# Patient Record
Sex: FEMALE
[De-identification: names, ages, dates, MRNs, and addresses within clinical notes are randomized per-mention and may not be internally consistent; named-entity substitution may affect disease eponyms.]

---

## 2024-08-30 ENCOUNTER — DASHBOARD ENCOUNTERS (OUTPATIENT)
Age: 64
End: 2024-08-30

## 2024-09-10 ENCOUNTER — OFFICE VISIT (OUTPATIENT)
Dept: RURAL CLINIC 2 | Facility: CLINIC | Age: 64
End: 2024-09-10
Payer: COMMERCIAL

## 2024-09-10 VITALS
DIASTOLIC BLOOD PRESSURE: 79 MMHG | HEIGHT: 63 IN | WEIGHT: 111.8 LBS | SYSTOLIC BLOOD PRESSURE: 108 MMHG | BODY MASS INDEX: 19.81 KG/M2 | HEART RATE: 74 BPM | TEMPERATURE: 97.8 F

## 2024-09-10 DIAGNOSIS — Z86.010 PERSONAL HISTORY OF COLONIC POLYPS: ICD-10-CM

## 2024-09-10 DIAGNOSIS — K86.81 EXOCRINE PANCREATIC INSUFFICIENCY: ICD-10-CM

## 2024-09-10 DIAGNOSIS — K52.89 (LYMPHOCYTIC) MICROSCOPIC COLITIS: ICD-10-CM

## 2024-09-10 PROBLEM — 428283002: Status: ACTIVE | Noted: 2024-09-10

## 2024-09-10 PROBLEM — 47367009: Status: ACTIVE | Noted: 2024-09-10

## 2024-09-10 PROBLEM — 236071009: Status: ACTIVE | Noted: 2024-09-10

## 2024-09-10 PROCEDURE — 99204 OFFICE O/P NEW MOD 45 MIN: CPT | Performed by: INTERNAL MEDICINE

## 2024-09-10 PROCEDURE — 99244 OFF/OP CNSLTJ NEW/EST MOD 40: CPT | Performed by: INTERNAL MEDICINE

## 2024-09-10 RX ORDER — PANCRELIPASE LIPASE, PANCRELIPASE PROTEASE, PANCRELIPASE AMYLASE 40000; 126000; 168000 [USP'U]/1; [USP'U]/1; [USP'U]/1
CAPSULE, DELAYED RELEASE ORAL
Qty: 100 CAPSULE | Status: ACTIVE | COMMUNITY

## 2024-09-10 RX ORDER — LEVETIRACETAM 500 MG/1
TABLET, FILM COATED ORAL
Qty: 800 TABLET | Status: ACTIVE | COMMUNITY

## 2024-09-10 RX ORDER — LAMOTRIGINE 25 MG/1
TABLET ORAL
Qty: 180 TABLET | Status: DISCONTINUED | COMMUNITY

## 2024-09-10 RX ORDER — CHOLESTYRAMINE LIGHT 4 G/5.7G
POWDER, FOR SUSPENSION ORAL
Qty: 239.4 GRAM | Status: ACTIVE | COMMUNITY

## 2024-09-10 RX ORDER — LAMOTRIGINE 100 MG/1
1 TABLET TABLET ORAL ONCE A DAY
Status: ACTIVE | COMMUNITY

## 2024-09-10 RX ORDER — QUETIAPINE FUMARATE 25 MG/1
TABLET ORAL
Qty: 180 TABLET | Status: ACTIVE | COMMUNITY

## 2024-09-10 RX ORDER — QUETIAPINE 50 MG/1
1 TABLET AT BEDTIME TABLET, FILM COATED ORAL ONCE A DAY
Status: ACTIVE | COMMUNITY

## 2024-09-10 RX ORDER — AMLODIPINE BESYLATE 5 MG/1
TABLET ORAL
Qty: 100 TABLET | Status: ACTIVE | COMMUNITY

## 2024-09-10 RX ORDER — CLOBAZAM 10 MG/1
TABLET ORAL
Qty: 75 TABLET | Status: ACTIVE | COMMUNITY

## 2024-09-10 RX ORDER — MELOXICAM 7.5 MG/1
TABLET ORAL
Qty: 15 TABLET | Status: DISCONTINUED | COMMUNITY

## 2024-09-10 RX ORDER — PANCRELIPASE LIPASE, PANCRELIPASE PROTEASE, PANCRELIPASE AMYLASE 40000; 126000; 168000 [USP'U]/1; [USP'U]/1; [USP'U]/1
2 CAPSULES WITH MEALS AND 1 WITH SNACKS CAPSULE, DELAYED RELEASE ORAL
Qty: 900 CAPSULES | Refills: 11 | OUTPATIENT
Start: 2024-09-10 | End: 2027-08-26

## 2024-09-10 RX ORDER — CEPHALEXIN 500 MG/1
TAKE 1 CAPSULE BY MOUTH FOUR TIMES DAILY CAPSULE ORAL
Qty: 28 EACH | Refills: 0 | Status: DISCONTINUED | COMMUNITY

## 2024-09-10 RX ORDER — METOPROLOL SUCCINATE 25 MG/1
TABLET, EXTENDED RELEASE ORAL
Qty: 90 TABLET | Status: ACTIVE | COMMUNITY

## 2024-09-10 RX ORDER — CARBAMAZEPINE 100 MG/1
CAPSULE, EXTENDED RELEASE ORAL
Qty: 420 CAPSULE | Status: DISCONTINUED | COMMUNITY

## 2024-09-10 RX ORDER — LAMOTRIGINE 100 MG/1
TABLET ORAL
Qty: 200 TABLET | Status: ACTIVE | COMMUNITY

## 2024-09-10 RX ORDER — POTASSIUM CHLORIDE 1500 MG/1
TABLET, FILM COATED, EXTENDED RELEASE ORAL
Qty: 14 TABLET | Status: ACTIVE | COMMUNITY

## 2024-09-10 RX ORDER — ALENDRONATE SODIUM 70 MG/1
TABLET ORAL
Qty: 12 TABLET | Status: ACTIVE | COMMUNITY

## 2024-09-10 NOTE — HPI-ZZZTODAY'S VISIT
The patient is a 64-year-old female who presents on referral from Lola Howell NP for exocrine pancreatic insufficiency.  A copy of this document to be sent to the referring provider.  The patient was recently diagnosed with EPI and is currently taking Zenpep 40K with each meal.  She said she continues with intermittent loose stools and will take either Colesytramine or Imodium with good control.  Previous colonoscopy was done 3 months ago and said benign colon polyps were found.

## 2024-09-19 ENCOUNTER — WEB ENCOUNTER (OUTPATIENT)
Dept: RURAL CLINIC 2 | Facility: CLINIC | Age: 64
End: 2024-09-19

## 2024-09-20 ENCOUNTER — WEB ENCOUNTER (OUTPATIENT)
Dept: RURAL CLINIC 2 | Facility: CLINIC | Age: 64
End: 2024-09-20

## 2024-09-28 ENCOUNTER — WEB ENCOUNTER (OUTPATIENT)
Dept: RURAL CLINIC 2 | Facility: CLINIC | Age: 64
End: 2024-09-28

## 2024-09-30 ENCOUNTER — OFFICE VISIT (OUTPATIENT)
Dept: RURAL CLINIC 2 | Facility: CLINIC | Age: 64
End: 2024-09-30

## 2024-09-30 VITALS
WEIGHT: 110.2 LBS | HEIGHT: 63 IN | SYSTOLIC BLOOD PRESSURE: 123 MMHG | TEMPERATURE: 97.1 F | HEART RATE: 73 BPM | DIASTOLIC BLOOD PRESSURE: 86 MMHG | BODY MASS INDEX: 19.53 KG/M2

## 2024-09-30 RX ORDER — LEVETIRACETAM 500 MG/1
TABLET, FILM COATED ORAL
Qty: 800 TABLET | Status: ACTIVE | COMMUNITY

## 2024-09-30 RX ORDER — PANCRELIPASE LIPASE, PANCRELIPASE PROTEASE, PANCRELIPASE AMYLASE 40000; 126000; 168000 [USP'U]/1; [USP'U]/1; [USP'U]/1
CAPSULE, DELAYED RELEASE ORAL
Qty: 100 CAPSULE | Status: DISCONTINUED | COMMUNITY

## 2024-09-30 RX ORDER — AMLODIPINE BESYLATE 5 MG/1
TABLET ORAL
Qty: 100 TABLET | Status: ACTIVE | COMMUNITY

## 2024-09-30 RX ORDER — CLOBAZAM 10 MG/1
TABLET ORAL
Qty: 75 TABLET | Status: ACTIVE | COMMUNITY

## 2024-09-30 RX ORDER — CHOLESTYRAMINE LIGHT 4 G/5.7G
POWDER, FOR SUSPENSION ORAL
Qty: 239.4 GRAM | Status: ACTIVE | COMMUNITY

## 2024-09-30 RX ORDER — QUETIAPINE FUMARATE 25 MG/1
1 TABLET AT BEDTIME TABLET ORAL
Status: ACTIVE | COMMUNITY

## 2024-09-30 RX ORDER — PANCRELIPASE LIPASE, PANCRELIPASE PROTEASE, PANCRELIPASE AMYLASE 40000; 126000; 168000 [USP'U]/1; [USP'U]/1; [USP'U]/1
2 CAPSULES WITH MEALS AND 1 WITH SNACKS CAPSULE, DELAYED RELEASE ORAL
Qty: 900 CAPSULES | Refills: 11 | OUTPATIENT

## 2024-09-30 RX ORDER — POTASSIUM CHLORIDE 1500 MG/1
TABLET, FILM COATED, EXTENDED RELEASE ORAL
Qty: 14 TABLET | Status: ACTIVE | COMMUNITY

## 2024-09-30 RX ORDER — METOPROLOL SUCCINATE 25 MG/1
TABLET, EXTENDED RELEASE ORAL
Qty: 90 TABLET | Status: ACTIVE | COMMUNITY

## 2024-09-30 RX ORDER — QUETIAPINE 50 MG/1
1 TABLET AT BEDTIME TABLET, FILM COATED ORAL ONCE A DAY
Status: DISCONTINUED | COMMUNITY

## 2024-09-30 RX ORDER — LAMOTRIGINE 100 MG/1
TABLET ORAL
Qty: 200 TABLET | Status: DISCONTINUED | COMMUNITY

## 2024-09-30 RX ORDER — LAMOTRIGINE 100 MG/1
1 TABLET TABLET ORAL TWICE A DAY
Status: ACTIVE | COMMUNITY

## 2024-09-30 RX ORDER — PANCRELIPASE LIPASE, PANCRELIPASE PROTEASE, PANCRELIPASE AMYLASE 40000; 126000; 168000 [USP'U]/1; [USP'U]/1; [USP'U]/1
2 CAPSULES WITH MEALS AND 1 WITH SNACKS CAPSULE, DELAYED RELEASE ORAL
Qty: 900 CAPSULES | Refills: 11 | Status: ACTIVE | COMMUNITY
Start: 2024-09-10 | End: 2027-08-26

## 2024-09-30 RX ORDER — ALENDRONATE SODIUM 70 MG/1
TABLET ORAL
Qty: 12 TABLET | Status: ACTIVE | COMMUNITY

## 2024-09-30 NOTE — HPI-ZZZTODAY'S VISIT
The patient is a 64-year-old female who presents on referral from Lola Howell NP for exocrine pancreatic insufficiency.  A copy of this document to be sent to the referring provider.  The patient was recently diagnosed with EPI and is currently taking Zenpep 40K with each meal.  She said she continues with intermittent loose stools and will take either Colesytramine or Imodium with good control.  Previous colonoscopy was done 3 months ago and said benign colon polyps were found. 0930/2024 The patient returns for follow-up for the diagnosis of EPI and is here to discuss her medications.  I saw her 3 weeks ago for the first time and was recently diagnosed with EPI and was continuing to have loose stools while taking Zenpep.  I increased the dose of Zenpep and her symptoms have improved.  She continues to take either Cholestyramine or Imodium with good control.  She is having some difficulty adjusting the times of her medications so that Colesytramine does not interfere.  She overall thinks Imodium has been working better.

## 2024-10-08 ENCOUNTER — WEB ENCOUNTER (OUTPATIENT)
Dept: RURAL CLINIC 2 | Facility: CLINIC | Age: 64
End: 2024-10-08

## 2024-10-09 ENCOUNTER — WEB ENCOUNTER (OUTPATIENT)
Dept: RURAL CLINIC 2 | Facility: CLINIC | Age: 64
End: 2024-10-09

## 2024-10-27 ENCOUNTER — WEB ENCOUNTER (OUTPATIENT)
Dept: RURAL CLINIC 2 | Facility: CLINIC | Age: 64
End: 2024-10-27

## 2024-10-28 ENCOUNTER — WEB ENCOUNTER (OUTPATIENT)
Dept: RURAL CLINIC 2 | Facility: CLINIC | Age: 64
End: 2024-10-28

## 2024-10-29 ENCOUNTER — WEB ENCOUNTER (OUTPATIENT)
Dept: RURAL CLINIC 2 | Facility: CLINIC | Age: 64
End: 2024-10-29

## 2024-11-02 ENCOUNTER — WEB ENCOUNTER (OUTPATIENT)
Dept: RURAL CLINIC 2 | Facility: CLINIC | Age: 64
End: 2024-11-02

## 2024-11-04 ENCOUNTER — WEB ENCOUNTER (OUTPATIENT)
Dept: RURAL CLINIC 2 | Facility: CLINIC | Age: 64
End: 2024-11-04

## 2024-11-29 ENCOUNTER — WEB ENCOUNTER (OUTPATIENT)
Dept: RURAL CLINIC 2 | Facility: CLINIC | Age: 64
End: 2024-11-29

## 2024-12-09 ENCOUNTER — WEB ENCOUNTER (OUTPATIENT)
Dept: RURAL CLINIC 4 | Facility: CLINIC | Age: 64
End: 2024-12-09

## 2024-12-10 ENCOUNTER — OFFICE VISIT (OUTPATIENT)
Dept: RURAL CLINIC 2 | Facility: CLINIC | Age: 64
End: 2024-12-10

## 2024-12-10 RX ORDER — PANCRELIPASE LIPASE, PANCRELIPASE PROTEASE, PANCRELIPASE AMYLASE 40000; 126000; 168000 [USP'U]/1; [USP'U]/1; [USP'U]/1
2 CAPSULES WITH MEALS AND 1 WITH SNACKS CAPSULE, DELAYED RELEASE ORAL
Qty: 900 CAPSULES | Refills: 11 | Status: ACTIVE | COMMUNITY

## 2024-12-10 RX ORDER — CLOBAZAM 10 MG/1
TABLET ORAL
Qty: 75 TABLET | Status: ACTIVE | COMMUNITY

## 2024-12-10 RX ORDER — QUETIAPINE FUMARATE 25 MG/1
1 TABLET AT BEDTIME TABLET ORAL
Status: ACTIVE | COMMUNITY

## 2024-12-10 RX ORDER — AMLODIPINE BESYLATE 5 MG/1
TABLET ORAL
Qty: 100 TABLET | Status: ACTIVE | COMMUNITY

## 2024-12-10 RX ORDER — CHOLESTYRAMINE LIGHT 4 G/5.7G
POWDER, FOR SUSPENSION ORAL
Qty: 239.4 GRAM | Status: ACTIVE | COMMUNITY

## 2024-12-10 RX ORDER — METOPROLOL SUCCINATE 25 MG/1
TABLET, EXTENDED RELEASE ORAL
Qty: 90 TABLET | Status: ACTIVE | COMMUNITY

## 2024-12-10 RX ORDER — LAMOTRIGINE 100 MG/1
1 TABLET TABLET ORAL TWICE A DAY
Status: ACTIVE | COMMUNITY

## 2024-12-10 RX ORDER — POTASSIUM CHLORIDE 1500 MG/1
TABLET, FILM COATED, EXTENDED RELEASE ORAL
Qty: 14 TABLET | Status: ACTIVE | COMMUNITY

## 2024-12-10 RX ORDER — ALENDRONATE SODIUM 70 MG/1
TABLET ORAL
Qty: 12 TABLET | Status: ACTIVE | COMMUNITY

## 2024-12-10 RX ORDER — LEVETIRACETAM 500 MG/1
TABLET, FILM COATED ORAL
Qty: 800 TABLET | Status: ACTIVE | COMMUNITY

## 2024-12-12 ENCOUNTER — WEB ENCOUNTER (OUTPATIENT)
Dept: RURAL CLINIC 2 | Facility: CLINIC | Age: 64
End: 2024-12-12

## 2024-12-16 ENCOUNTER — WEB ENCOUNTER (OUTPATIENT)
Dept: RURAL CLINIC 2 | Facility: CLINIC | Age: 64
End: 2024-12-16

## 2024-12-18 ENCOUNTER — WEB ENCOUNTER (OUTPATIENT)
Dept: RURAL CLINIC 2 | Facility: CLINIC | Age: 64
End: 2024-12-18

## 2025-01-07 ENCOUNTER — OFFICE VISIT (OUTPATIENT)
Dept: RURAL CLINIC 2 | Facility: CLINIC | Age: 65
End: 2025-01-07
Payer: COMMERCIAL

## 2025-01-07 VITALS
BODY MASS INDEX: 21.3 KG/M2 | SYSTOLIC BLOOD PRESSURE: 100 MMHG | HEART RATE: 79 BPM | TEMPERATURE: 96.6 F | WEIGHT: 120.2 LBS | HEIGHT: 63 IN | DIASTOLIC BLOOD PRESSURE: 82 MMHG

## 2025-01-07 DIAGNOSIS — K52.9 CHRONIC DIARRHEA: ICD-10-CM

## 2025-01-07 DIAGNOSIS — Z86.0100 PERSONAL HISTORY OF COLONIC POLYPS: ICD-10-CM

## 2025-01-07 DIAGNOSIS — K86.81 EXOCRINE PANCREATIC INSUFFICIENCY: ICD-10-CM

## 2025-01-07 PROCEDURE — 99213 OFFICE O/P EST LOW 20 MIN: CPT | Performed by: INTERNAL MEDICINE

## 2025-01-07 RX ORDER — ASCORBIC ACID 125 MG
AS DIRECTED TABLET,CHEWABLE ORAL
Status: ACTIVE | COMMUNITY

## 2025-01-07 RX ORDER — PANCRELIPASE LIPASE, PANCRELIPASE PROTEASE, PANCRELIPASE AMYLASE 40000; 126000; 168000 [USP'U]/1; [USP'U]/1; [USP'U]/1
2 CAPSULES WITH MEALS AND 1 WITH SNACKS CAPSULE, DELAYED RELEASE ORAL
Qty: 900 CAPSULES | Refills: 11 | OUTPATIENT

## 2025-01-07 RX ORDER — CHOLESTYRAMINE LIGHT 4 G/5.7G
POWDER, FOR SUSPENSION ORAL
Qty: 239.4 GRAM | Status: DISCONTINUED | COMMUNITY

## 2025-01-07 RX ORDER — METOPROLOL SUCCINATE 25 MG/1
TABLET, EXTENDED RELEASE ORAL
Qty: 90 TABLET | Status: DISCONTINUED | COMMUNITY

## 2025-01-07 RX ORDER — LEVETIRACETAM 1000 MG/1
4 TABLETS TO EQUAL 4000 MGS TABLET, FILM COATED ORAL DAILY
Status: ACTIVE | COMMUNITY

## 2025-01-07 RX ORDER — POTASSIUM CHLORIDE 1500 MG/1
TABLET, FILM COATED, EXTENDED RELEASE ORAL
Qty: 14 TABLET | Status: ACTIVE | COMMUNITY

## 2025-01-07 RX ORDER — PANCRELIPASE LIPASE, PANCRELIPASE PROTEASE, PANCRELIPASE AMYLASE 40000; 126000; 168000 [USP'U]/1; [USP'U]/1; [USP'U]/1
2 CAPSULES WITH MEALS AND 1 WITH SNACKS CAPSULE, DELAYED RELEASE ORAL
Qty: 900 CAPSULES | Refills: 11 | Status: ACTIVE | COMMUNITY

## 2025-01-07 RX ORDER — LAMOTRIGINE 100 MG/1
1 TABLET TABLET ORAL TWICE A DAY
Status: ACTIVE | COMMUNITY

## 2025-01-07 RX ORDER — ALENDRONATE SODIUM 70 MG/1
TABLET ORAL
Qty: 12 TABLET | Status: ACTIVE | COMMUNITY

## 2025-01-07 RX ORDER — CLOBAZAM 10 MG/1
10 MGS IN THE MORNING AND 15 MGS AT BEDTIME TABLET ORAL TWICE DAILY
Status: ACTIVE | COMMUNITY

## 2025-01-07 RX ORDER — AMLODIPINE BESYLATE 10 MG/1
1 TABLET TABLET ORAL ONCE A DAY
Qty: 100 TABLET | Status: ACTIVE | COMMUNITY

## 2025-01-07 RX ORDER — QUETIAPINE FUMARATE 25 MG/1
1 TABLET AT BEDTIME TABLET ORAL
Status: ACTIVE | COMMUNITY

## 2025-01-07 NOTE — HPI-ZZZTODAY'S VISIT
The patient is a 64-year-old female who presents on referral from Lola Howell NP for exocrine pancreatic insufficiency.  A copy of this document to be sent to the referring provider.  The patient was recently diagnosed with EPI and is currently taking Zenpep 40K with each meal.  She said she continues with intermittent loose stools and will take either Colesytramine or Imodium with good control.  Previous colonoscopy was done 3 months ago and said benign colon polyps were found. 09/30/2024 The patient returns for follow-up for the diagnosis of EPI and is here to discuss her medications.  I saw her 3 weeks ago for the first time and was recently diagnosed with EPI and was continuing to have loose stools while taking Zenpep.  I increased the dose of Zenpep and her symptoms have improved.  She continues to take either Cholestyramine or Imodium with good control.  She is having some difficulty adjusting the times of her medications so that Colesytramine does not interfere.  She overall thinks Imodium has been working better.  01/07/2025 The pt returns for f/u for EPI and continues on Zenpep. She was having trouble  maintaining her weight and referred to nutritionist and with a few changes in her diet she was able to gain weight. BMI is normal.  Her bowel pattern is soft formed occurring 2-3 times daily with the assistance of a dose of Imodium per day. Previous colonoscopy was done this past June with findings of colon polyps.

## 2025-03-25 ENCOUNTER — OFFICE VISIT (OUTPATIENT)
Dept: RURAL CLINIC 2 | Facility: CLINIC | Age: 65
End: 2025-03-25
Payer: COMMERCIAL

## 2025-03-25 DIAGNOSIS — K86.81 EXOCRINE PANCREATIC INSUFFICIENCY: ICD-10-CM

## 2025-03-25 DIAGNOSIS — Z86.0100 HISTORY OF COLON POLYPS: ICD-10-CM

## 2025-03-25 DIAGNOSIS — K52.9 CHRONIC DIARRHEA: ICD-10-CM

## 2025-03-25 PROBLEM — 428283002: Status: ACTIVE | Noted: 2025-03-25

## 2025-03-25 PROCEDURE — 99213 OFFICE O/P EST LOW 20 MIN: CPT | Performed by: INTERNAL MEDICINE

## 2025-03-25 RX ORDER — PANCRELIPASE LIPASE, PANCRELIPASE PROTEASE, PANCRELIPASE AMYLASE 40000; 126000; 168000 [USP'U]/1; [USP'U]/1; [USP'U]/1
2 CAPSULES WITH MEALS AND 1 WITH SNACKS CAPSULE, DELAYED RELEASE ORAL
Qty: 900 CAPSULES | Refills: 11 | OUTPATIENT
Start: 2025-03-25

## 2025-03-25 RX ORDER — POTASSIUM CHLORIDE 1500 MG/1
TABLET, FILM COATED, EXTENDED RELEASE ORAL
Qty: 14 TABLET | Status: ACTIVE | COMMUNITY

## 2025-03-25 RX ORDER — QUETIAPINE FUMARATE 25 MG/1
1 TABLET AT BEDTIME TABLET ORAL
Status: ACTIVE | COMMUNITY

## 2025-03-25 RX ORDER — PANCRELIPASE LIPASE, PANCRELIPASE PROTEASE, PANCRELIPASE AMYLASE 40000; 126000; 168000 [USP'U]/1; [USP'U]/1; [USP'U]/1
2 CAPSULES WITH MEALS AND 1 WITH SNACKS CAPSULE, DELAYED RELEASE ORAL
Qty: 900 CAPSULES | Refills: 11 | Status: ACTIVE | COMMUNITY

## 2025-03-25 RX ORDER — AMLODIPINE BESYLATE 10 MG/1
1 TABLET TABLET ORAL ONCE A DAY
Qty: 100 TABLET | Status: ACTIVE | COMMUNITY

## 2025-03-25 RX ORDER — LEVETIRACETAM 1000 MG/1
4 TABLETS TO EQUAL 4000 MGS TABLET, FILM COATED ORAL DAILY
Status: ACTIVE | COMMUNITY

## 2025-03-25 RX ORDER — ASCORBIC ACID 125 MG
AS DIRECTED TABLET,CHEWABLE ORAL
Status: ACTIVE | COMMUNITY

## 2025-03-25 RX ORDER — CLOBAZAM 10 MG/1
10 MGS IN THE MORNING AND 15 MGS AT BEDTIME TABLET ORAL TWICE DAILY
Status: ACTIVE | COMMUNITY

## 2025-03-25 RX ORDER — ALENDRONATE SODIUM 70 MG/1
TABLET ORAL
Qty: 12 TABLET | Status: ACTIVE | COMMUNITY

## 2025-03-25 RX ORDER — LAMOTRIGINE 100 MG/1
1 TABLET TABLET ORAL TWICE A DAY
Status: ACTIVE | COMMUNITY

## 2025-03-25 NOTE — HPI-ZZZTODAY'S VISIT
The patient is a 64-year-old female who presents on referral from Lola Howell NP for exocrine pancreatic insufficiency.  A copy of this document to be sent to the referring provider.  The patient was recently diagnosed with EPI and is currently taking Zenpep 40K with each meal.  She said she continues with intermittent loose stools and will take either Colesytramine or Imodium with good control.  Previous colonoscopy was done 3 months ago and said benign colon polyps were found. 09/30/2024 The patient returns for follow-up for the diagnosis of EPI and is here to discuss her medications.  I saw her 3 weeks ago for the first time and was recently diagnosed with EPI and was continuing to have loose stools while taking Zenpep.  I increased the dose of Zenpep and her symptoms have improved.  She continues to take either Cholestyramine or Imodium with good control.  She is having some difficulty adjusting the times of her medications so that Colesytramine does not interfere.  She overall thinks Imodium has been working better.  01/07/2025 The pt returns for f/u for EPI and continues on Zenpep. She was having trouble  maintaining her weight and referred to nutritionist and with a few changes in her diet she was able to gain weight. BMI is normal.  Her bowel pattern is soft formed occurring 2-3 times daily with the assistance of a dose of Imodium per day. Previous colonoscopy was done this past June with findings of colon polyps. 03/25/2025: The patient returns for history of EPI and continues to do very well on Zenpep.  She offers no complaints of abdominal bloating.  She occasionally has loose stool and will take Imodium with good control.  She is currently maintaining her weight with the assistance of a diet nutrition.  Her colonoscopy is currently up to date.

## 2025-03-27 ENCOUNTER — WEB ENCOUNTER (OUTPATIENT)
Dept: RURAL CLINIC 2 | Facility: CLINIC | Age: 65
End: 2025-03-27

## 2025-06-17 ENCOUNTER — OFFICE VISIT (OUTPATIENT)
Dept: RURAL CLINIC 8 | Facility: CLINIC | Age: 65
End: 2025-06-17
Payer: COMMERCIAL

## 2025-06-17 DIAGNOSIS — R14.0 BLOATING: ICD-10-CM

## 2025-06-17 DIAGNOSIS — K86.81 EXOCRINE PANCREATIC INSUFFICIENCY: ICD-10-CM

## 2025-06-17 DIAGNOSIS — R19.4 CHANGE IN BOWEL HABITS: ICD-10-CM

## 2025-06-17 PROCEDURE — 99214 OFFICE O/P EST MOD 30 MIN: CPT | Performed by: INTERNAL MEDICINE

## 2025-06-17 RX ORDER — CLOBAZAM 10 MG/1
10 MGS IN THE MORNING AND 15 MGS AT BEDTIME TABLET ORAL TWICE DAILY
Status: ACTIVE | COMMUNITY

## 2025-06-17 RX ORDER — LEVETIRACETAM 1000 MG/1
4 TABLETS TO EQUAL 4000 MGS TABLET, FILM COATED ORAL DAILY
Status: ACTIVE | COMMUNITY

## 2025-06-17 RX ORDER — POTASSIUM CHLORIDE 1500 MG/1
TABLET, FILM COATED, EXTENDED RELEASE ORAL
Qty: 14 TABLET | Status: ACTIVE | COMMUNITY

## 2025-06-17 RX ORDER — ALENDRONATE SODIUM 70 MG/1
TABLET ORAL
Qty: 12 TABLET | Status: ACTIVE | COMMUNITY

## 2025-06-17 RX ORDER — PANCRELIPASE LIPASE, PANCRELIPASE PROTEASE, PANCRELIPASE AMYLASE 40000; 126000; 168000 [USP'U]/1; [USP'U]/1; [USP'U]/1
2 CAPSULES WITH MEALS AND 1 WITH SNACKS CAPSULE, DELAYED RELEASE ORAL
Qty: 900 CAPSULES | Refills: 11 | Status: ACTIVE | COMMUNITY
Start: 2025-03-25

## 2025-06-17 RX ORDER — ASCORBIC ACID 125 MG
AS DIRECTED TABLET,CHEWABLE ORAL
Status: ACTIVE | COMMUNITY

## 2025-06-17 RX ORDER — QUETIAPINE FUMARATE 25 MG/1
1 TABLET AT BEDTIME TABLET ORAL
Status: ACTIVE | COMMUNITY

## 2025-06-17 RX ORDER — AMLODIPINE BESYLATE 10 MG/1
1 TABLET TABLET ORAL ONCE A DAY
Qty: 100 TABLET | Status: ACTIVE | COMMUNITY

## 2025-06-17 RX ORDER — LAMOTRIGINE 100 MG/1
1 TABLET TABLET ORAL TWICE A DAY
Status: ACTIVE | COMMUNITY

## 2025-06-17 NOTE — HPI-ZZZTODAY'S VISIT
The patient is a 64-year-old female who presents on referral from Lola Howell NP for exocrine pancreatic insufficiency.  A copy of this document to be sent to the referring provider.  The patient was recently diagnosed with EPI and is currently taking Zenpep 40K with each meal.  She said she continues with intermittent loose stools and will take either Colesytramine or Imodium with good control.  Previous colonoscopy was done 3 months ago and said benign colon polyps were found. 09/30/2024 The patient returns for follow-up for the diagnosis of EPI and is here to discuss her medications.  I saw her 3 weeks ago for the first time and was recently diagnosed with EPI and was continuing to have loose stools while taking Zenpep.  I increased the dose of Zenpep and her symptoms have improved.  She continues to take either Cholestyramine or Imodium with good control.  She is having some difficulty adjusting the times of her medications so that Colesytramine does not interfere.  She overall thinks Imodium has been working better.  01/07/2025 The pt returns for f/u for EPI and continues on Zenpep. She was having trouble  maintaining her weight and referred to nutritionist and with a few changes in her diet she was able to gain weight. BMI is normal.  Her bowel pattern is soft formed occurring 2-3 times daily with the assistance of a dose of Imodium per day. Previous colonoscopy was done this past June with findings of colon polyps. 03/25/2025: The patient returns for history of EPI and continues to do very well on Zenpep.  She offers no complaints of abdominal bloating.  She occasionally has loose stool and will take Imodium with good control.  She is currently maintaining her weight with the assistance of a diet nutrition.  Her colonoscopy is currently up to date. - 6/17/2025: she is complaining of lower back pain. she tells me that she has seen [facility] gastroenterolol[facility]ist in [place]. it is unclear why she changed to me. she reports having a colonoscopy with them about a year ago according to her verbal report.

## 2025-06-17 NOTE — PHYSICAL EXAM CONSTITUTIONAL:
well developed, well nourished , in no acute distress , ambulating without difficulty , normal communication ability
Ananth

## 2025-06-23 ENCOUNTER — WEB ENCOUNTER (OUTPATIENT)
Dept: RURAL CLINIC 2 | Facility: CLINIC | Age: 65
End: 2025-06-23

## 2025-06-24 ENCOUNTER — WEB ENCOUNTER (OUTPATIENT)
Dept: RURAL CLINIC 2 | Facility: CLINIC | Age: 65
End: 2025-06-24

## 2025-06-25 ENCOUNTER — WEB ENCOUNTER (OUTPATIENT)
Dept: RURAL CLINIC 2 | Facility: CLINIC | Age: 65
End: 2025-06-25

## 2025-07-04 ENCOUNTER — WEB ENCOUNTER (OUTPATIENT)
Dept: RURAL CLINIC 2 | Facility: CLINIC | Age: 65
End: 2025-07-04

## 2025-07-05 ENCOUNTER — WEB ENCOUNTER (OUTPATIENT)
Dept: RURAL CLINIC 2 | Facility: CLINIC | Age: 65
End: 2025-07-05

## 2025-07-06 ENCOUNTER — WEB ENCOUNTER (OUTPATIENT)
Dept: RURAL CLINIC 2 | Facility: CLINIC | Age: 65
End: 2025-07-06

## 2025-08-08 ENCOUNTER — WEB ENCOUNTER (OUTPATIENT)
Dept: RURAL CLINIC 8 | Facility: CLINIC | Age: 65
End: 2025-08-08

## 2025-08-24 ENCOUNTER — WEB ENCOUNTER (OUTPATIENT)
Dept: RURAL CLINIC 2 | Facility: CLINIC | Age: 65
End: 2025-08-24